# Patient Record
Sex: FEMALE | Race: WHITE
[De-identification: names, ages, dates, MRNs, and addresses within clinical notes are randomized per-mention and may not be internally consistent; named-entity substitution may affect disease eponyms.]

---

## 2020-03-12 ENCOUNTER — RESULT REVIEW (OUTPATIENT)
Age: 35
End: 2020-03-12

## 2021-03-18 ENCOUNTER — RESULT REVIEW (OUTPATIENT)
Age: 36
End: 2021-03-18

## 2022-07-29 ENCOUNTER — RESULT REVIEW (OUTPATIENT)
Age: 37
End: 2022-07-29

## 2024-09-09 ENCOUNTER — APPOINTMENT (OUTPATIENT)
Dept: ORTHOPEDIC SURGERY | Facility: CLINIC | Age: 39
End: 2024-09-09
Payer: COMMERCIAL

## 2024-09-09 VITALS — BODY MASS INDEX: 34.15 KG/M2 | HEIGHT: 64 IN | WEIGHT: 200 LBS

## 2024-09-09 DIAGNOSIS — Q65.89 OTHER SPECIFIED CONGENITAL DEFORMITIES OF HIP: ICD-10-CM

## 2024-09-09 DIAGNOSIS — M22.8X2 OTHER DISORDERS OF PATELLA, LEFT KNEE: ICD-10-CM

## 2024-09-09 DIAGNOSIS — M22.42 CHONDROMALACIA PATELLAE, LEFT KNEE: ICD-10-CM

## 2024-09-09 DIAGNOSIS — E78.00 PURE HYPERCHOLESTEROLEMIA, UNSPECIFIED: ICD-10-CM

## 2024-09-09 DIAGNOSIS — M22.8X1 OTHER DISORDERS OF PATELLA, RIGHT KNEE: ICD-10-CM

## 2024-09-09 DIAGNOSIS — M25.551 PAIN IN RIGHT HIP: ICD-10-CM

## 2024-09-09 DIAGNOSIS — M22.41 CHONDROMALACIA PATELLAE, RIGHT KNEE: ICD-10-CM

## 2024-09-09 PROCEDURE — 72100 X-RAY EXAM L-S SPINE 2/3 VWS: CPT

## 2024-09-09 PROCEDURE — 99204 OFFICE O/P NEW MOD 45 MIN: CPT

## 2024-09-09 PROCEDURE — 72170 X-RAY EXAM OF PELVIS: CPT

## 2024-09-09 PROCEDURE — 73562 X-RAY EXAM OF KNEE 3: CPT | Mod: 50

## 2024-09-09 RX ORDER — MELOXICAM 15 MG/1
15 TABLET ORAL DAILY
Qty: 30 | Refills: 1 | Status: ACTIVE | COMMUNITY
Start: 2024-09-09 | End: 1900-01-01

## 2024-09-09 RX ORDER — PRAVASTATIN SODIUM 10 MG/1
10 TABLET ORAL
Refills: 0 | Status: ACTIVE | COMMUNITY

## 2024-09-09 NOTE — ASSESSMENT
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 Explained that she is overall slightly hyperlax- on exam has laxity in multiple joints- and discussed how it relates to the symptoms she is experiencing and treatment options. Would not recommend surgical intervention at this point. For now, would recommend a course of PT and anti-inflammatory. I am recommending the use of Meloxicam 15mg daily and discussed the risks and benefits of the medication, patient elects to proceed with this. F/up 6 weeks

## 2024-09-09 NOTE — HISTORY OF PRESENT ILLNESS
[de-identified] : 9/9/24: 39F with RT hip and b/l knee (L=R) pain. RT hip pain x 2 months. B/L knee pain 5+ years. No specific injury/trauma. Pt states she was a  and was standing on cement floors for years- feels this largely contributed to her pain. HIP: Most pain laterally, feels like it comes out of the socket. States hip pain has improved when she stops using hip abductor machines. KNEES: Most pain anteriorly. Pain worse with stairs, sit to stand, getting in and out of car. Motrin as needed with mild relief. No formal tx thus far for knees or hip. Frequently goes to gym. Ambulates without assistance.  [] : no [FreeTextEntry5] : right hip/ kylie knee pain for couple months. No injury. Having lateral hip pain. Having kylie anterior pain. Worsens when walking.

## 2024-09-09 NOTE — IMAGING
[de-identified] : RIGHT KNEE no effusion hypermobile patella nontender ROM 0-130  LEFT KNEE mild effusion hypermobile patella ROM 0-130 +2 pt pulses 5/5 strength bilaterally sensation intact nontender  RIGHT HIP nontender groin and troch positive impingement 5/5 strength no pain with resisted SLR  LEFT HIP nontender groin and troch positive impingement 5/5 strength no pain with resisted SLR   XR B/L KNEES (9/9/24) showing no sig OA, mild lateral tracking of patella bilaterally XR PELVIS (9/9/24) showing mild dysplasia bilaterally with up sloping sorcel XR LSPINE (9/9/24) showing no sig findings, good disc space

## 2024-09-09 NOTE — DISCUSSION/SUMMARY
[de-identified] : The patient was advised of the diagnosis.  The natural history of the pathology was explained in full to the patient in layman's terms. All questions were answered.  The risks and benefits of surgical and non-surgical treatment alternatives were explained in full to the patient.  Entered by Lianna Rutherford acting as a scribe.